# Patient Record
Sex: FEMALE | Race: WHITE | NOT HISPANIC OR LATINO | URBAN - METROPOLITAN AREA
[De-identification: names, ages, dates, MRNs, and addresses within clinical notes are randomized per-mention and may not be internally consistent; named-entity substitution may affect disease eponyms.]

---

## 2022-09-08 PROBLEM — G62.9 NEUROPATHY: Status: ACTIVE | Noted: 2017-08-28

## 2022-09-08 PROBLEM — F50.81 BINGE EATING DISORDER: Status: ACTIVE | Noted: 2022-09-08

## 2022-09-08 PROBLEM — F50.819 BINGE EATING DISORDER: Status: ACTIVE | Noted: 2022-09-08

## 2022-09-08 PROBLEM — E66.01 SEVERE OBESITY (BMI 35.0-39.9) WITH COMORBIDITY: Status: ACTIVE | Noted: 2022-09-08

## 2022-09-08 PROBLEM — G47.00 INSOMNIA: Status: ACTIVE | Noted: 2022-09-08

## 2022-09-08 PROBLEM — R53.83 FATIGUE: Status: ACTIVE | Noted: 2022-09-08

## 2022-09-08 PROBLEM — E66.3 OVERWEIGHT: Status: ACTIVE | Noted: 2022-09-08

## 2022-09-08 PROBLEM — R45.4 IRRITABILITY: Status: ACTIVE | Noted: 2017-08-28

## 2023-10-04 ENCOUNTER — PATIENT MESSAGE (OUTPATIENT)
Dept: ADMINISTRATIVE | Facility: OTHER | Age: 38
End: 2023-10-04

## 2024-04-08 PROBLEM — Z79.899 LONG-TERM USE OF HIGH-RISK MEDICATION: Status: ACTIVE | Noted: 2024-04-08

## 2024-04-08 PROBLEM — I10 PRIMARY HYPERTENSION: Status: ACTIVE | Noted: 2024-04-08

## 2024-04-08 PROBLEM — Z00.00 ENCOUNTER FOR WELLNESS EXAMINATION IN ADULT: Status: ACTIVE | Noted: 2024-04-08

## 2024-04-12 ENCOUNTER — LAB VISIT (OUTPATIENT)
Dept: LAB | Facility: HOSPITAL | Age: 39
End: 2024-04-12
Payer: COMMERCIAL

## 2024-04-12 DIAGNOSIS — Z00.00 ENCOUNTER FOR WELLNESS EXAMINATION IN ADULT: ICD-10-CM

## 2024-04-12 DIAGNOSIS — Z13.1 SCREENING FOR DIABETES MELLITUS (DM): ICD-10-CM

## 2024-04-12 DIAGNOSIS — I10 PRIMARY HYPERTENSION: ICD-10-CM

## 2024-04-12 DIAGNOSIS — F41.9 ANXIETY: ICD-10-CM

## 2024-04-12 LAB
ALBUMIN SERPL BCP-MCNC: 3.7 G/DL (ref 3.5–5.2)
ALP SERPL-CCNC: 64 U/L (ref 55–135)
ALT SERPL W/O P-5'-P-CCNC: 24 U/L (ref 10–44)
ANION GAP SERPL CALC-SCNC: 0 MMOL/L (ref 3–11)
AST SERPL-CCNC: 10 U/L (ref 10–40)
BASOPHILS # BLD AUTO: 0.06 K/UL (ref 0–0.2)
BASOPHILS NFR BLD: 0.9 % (ref 0–1.9)
BILIRUB SERPL-MCNC: 0.3 MG/DL (ref 0.1–1)
BUN SERPL-MCNC: 11 MG/DL (ref 6–20)
CALCIUM SERPL-MCNC: 8.6 MG/DL (ref 8.7–10.5)
CHLORIDE SERPL-SCNC: 112 MMOL/L (ref 95–110)
CHOLEST SERPL-MCNC: 171 MG/DL (ref 120–199)
CHOLEST/HDLC SERPL: 3.4 {RATIO} (ref 2–5)
CO2 SERPL-SCNC: 26 MMOL/L (ref 23–29)
CREAT SERPL-MCNC: 0.7 MG/DL (ref 0.5–1.4)
DIFFERENTIAL METHOD BLD: NORMAL
EOSINOPHIL # BLD AUTO: 0.3 K/UL (ref 0–0.5)
EOSINOPHIL NFR BLD: 4.2 % (ref 0–8)
ERYTHROCYTE [DISTWIDTH] IN BLOOD BY AUTOMATED COUNT: 12.4 % (ref 11.5–14.5)
EST. GFR  (NO RACE VARIABLE): >60 ML/MIN/1.73 M^2
ESTIMATED AVG GLUCOSE: 100 MG/DL (ref 68–131)
GLUCOSE SERPL-MCNC: 104 MG/DL (ref 70–110)
HBA1C MFR BLD: 5.1 % (ref 4–5.6)
HCT VFR BLD AUTO: 38 % (ref 37–48.5)
HCV AB SERPL QL IA: NORMAL
HDLC SERPL-MCNC: 50 MG/DL (ref 40–75)
HDLC SERPL: 29.2 % (ref 20–50)
HGB BLD-MCNC: 12.5 G/DL (ref 12–16)
HIV 1+2 AB+HIV1 P24 AG SERPL QL IA: NORMAL
IMM GRANULOCYTES # BLD AUTO: 0.02 K/UL (ref 0–0.04)
IMM GRANULOCYTES NFR BLD AUTO: 0.3 % (ref 0–0.5)
LDLC SERPL CALC-MCNC: 107.8 MG/DL (ref 63–159)
LYMPHOCYTES # BLD AUTO: 1.9 K/UL (ref 1–4.8)
LYMPHOCYTES NFR BLD: 27.8 % (ref 18–48)
MCH RBC QN AUTO: 31 PG (ref 27–31)
MCHC RBC AUTO-ENTMCNC: 32.9 G/DL (ref 32–36)
MCV RBC AUTO: 94 FL (ref 82–98)
MONOCYTES # BLD AUTO: 0.4 K/UL (ref 0.3–1)
MONOCYTES NFR BLD: 6.4 % (ref 4–15)
NEUTROPHILS # BLD AUTO: 4.1 K/UL (ref 1.8–7.7)
NEUTROPHILS NFR BLD: 60.4 % (ref 38–73)
NONHDLC SERPL-MCNC: 121 MG/DL
NRBC BLD-RTO: 0 /100 WBC
PLATELET # BLD AUTO: 355 K/UL (ref 150–450)
PMV BLD AUTO: 9.8 FL (ref 9.2–12.9)
POTASSIUM SERPL-SCNC: 4.5 MMOL/L (ref 3.5–5.1)
PROT SERPL-MCNC: 6.9 G/DL (ref 6–8.4)
RBC # BLD AUTO: 4.03 M/UL (ref 4–5.4)
SODIUM SERPL-SCNC: 138 MMOL/L (ref 136–145)
TRIGL SERPL-MCNC: 66 MG/DL (ref 30–150)
TSH SERPL DL<=0.005 MIU/L-ACNC: 1.96 UIU/ML (ref 0.4–4)
WBC # BLD AUTO: 6.7 K/UL (ref 3.9–12.7)

## 2024-04-12 PROCEDURE — 36415 COLL VENOUS BLD VENIPUNCTURE: CPT

## 2024-04-12 PROCEDURE — 80061 LIPID PANEL: CPT

## 2024-04-12 PROCEDURE — 80053 COMPREHEN METABOLIC PANEL: CPT

## 2024-04-12 PROCEDURE — 87389 HIV-1 AG W/HIV-1&-2 AB AG IA: CPT

## 2024-04-12 PROCEDURE — 83036 HEMOGLOBIN GLYCOSYLATED A1C: CPT

## 2024-04-12 PROCEDURE — 84443 ASSAY THYROID STIM HORMONE: CPT

## 2024-04-12 PROCEDURE — 85025 COMPLETE CBC W/AUTO DIFF WBC: CPT

## 2024-04-12 PROCEDURE — 86803 HEPATITIS C AB TEST: CPT

## 2024-07-08 PROBLEM — Z00.00 ENCOUNTER FOR WELLNESS EXAMINATION IN ADULT: Status: RESOLVED | Noted: 2024-04-08 | Resolved: 2024-07-08

## 2024-10-21 ENCOUNTER — PATIENT MESSAGE (OUTPATIENT)
Dept: FAMILY MEDICINE | Facility: CLINIC | Age: 39
End: 2024-10-21
Payer: COMMERCIAL

## 2025-04-11 ENCOUNTER — TELEPHONE (OUTPATIENT)
Dept: PHARMACY | Facility: CLINIC | Age: 40
End: 2025-04-11
Payer: COMMERCIAL

## 2025-04-11 NOTE — TELEPHONE ENCOUNTER
[facility]/Population Health Chart Review & Patient Outreach Details For Medication Adherence Project    Reason for Outreach Encounter: 3rd Party payor non-compliance report ([employer], [employer], [employer], etc)  2.  Patient Outreach Method: Reviewed patient chart  and [employer] message  3.   Medication in question:    Hypertension Medications              lisinopriL 10 MG tablet Take 10 mg by mouth once daily.                 LF 90 ds 8/21/24    4.  Reviewed and or Updates Made To: Patient Chart  5. Outreach Outcomes and/or actions taken: Sent inquiry to patient: Waiting for response  Additional Notes: